# Patient Record
Sex: FEMALE | Race: OTHER | URBAN - METROPOLITAN AREA
[De-identification: names, ages, dates, MRNs, and addresses within clinical notes are randomized per-mention and may not be internally consistent; named-entity substitution may affect disease eponyms.]

---

## 2022-03-19 PROBLEM — R87.610 ASCUS WITH POSITIVE HIGH RISK HPV CERVICAL: Status: ACTIVE | Noted: 2019-10-14

## 2022-03-19 PROBLEM — R87.810 ASCUS WITH POSITIVE HIGH RISK HPV CERVICAL: Status: ACTIVE | Noted: 2019-10-14

## 2022-03-20 PROBLEM — R10.84 GENERALIZED ABDOMINAL PAIN: Status: ACTIVE | Noted: 2020-02-10

## 2023-05-24 ENCOUNTER — TELEPHONE (OUTPATIENT)
Dept: OBGYN CLINIC | Age: 28
End: 2023-05-24

## 2023-11-14 ENCOUNTER — OFFICE VISIT (OUTPATIENT)
Dept: OBGYN CLINIC | Age: 28
End: 2023-11-14
Payer: COMMERCIAL

## 2023-11-14 VITALS
WEIGHT: 168 LBS | HEIGHT: 55 IN | DIASTOLIC BLOOD PRESSURE: 80 MMHG | BODY MASS INDEX: 38.88 KG/M2 | SYSTOLIC BLOOD PRESSURE: 118 MMHG

## 2023-11-14 DIAGNOSIS — Z12.4 SCREENING FOR CERVICAL CANCER: ICD-10-CM

## 2023-11-14 DIAGNOSIS — Z12.39 SCREENING BREAST EXAMINATION: ICD-10-CM

## 2023-11-14 DIAGNOSIS — N94.6 DYSMENORRHEA: ICD-10-CM

## 2023-11-14 DIAGNOSIS — Z11.51 SCREENING FOR HPV (HUMAN PAPILLOMAVIRUS): ICD-10-CM

## 2023-11-14 DIAGNOSIS — Z86.39 HISTORY OF THYROID NODULE: ICD-10-CM

## 2023-11-14 DIAGNOSIS — Z01.419 WELL WOMAN EXAM WITH ROUTINE GYNECOLOGICAL EXAM: Primary | ICD-10-CM

## 2023-11-14 DIAGNOSIS — I95.1 SYMPATHOTONIC ORTHOSTATIC HYPOTENSION: ICD-10-CM

## 2023-11-14 DIAGNOSIS — N92.0 MENORRHAGIA WITH REGULAR CYCLE: ICD-10-CM

## 2023-11-14 DIAGNOSIS — E78.89 LIPIDS ABNORMAL: ICD-10-CM

## 2023-11-14 DIAGNOSIS — D50.0 IRON DEFICIENCY ANEMIA DUE TO CHRONIC BLOOD LOSS: ICD-10-CM

## 2023-11-14 PROBLEM — E66.01 SEVERE OBESITY WITH BODY MASS INDEX (BMI) OF 35.0 TO 39.9 WITH SERIOUS COMORBIDITY (HCC): Status: ACTIVE | Noted: 2018-09-04

## 2023-11-14 PROBLEM — R87.610 ASCUS WITH POSITIVE HIGH RISK HPV CERVICAL: Status: ACTIVE | Noted: 2019-10-14

## 2023-11-14 PROBLEM — Z97.5 IUD (INTRAUTERINE DEVICE) IN PLACE: Status: ACTIVE | Noted: 2019-09-25

## 2023-11-14 PROBLEM — N20.1 URETEROLITHIASIS: Status: ACTIVE | Noted: 2022-11-07

## 2023-11-14 PROBLEM — Q77.4 ACHONDROPLASIA: Status: ACTIVE | Noted: 2018-08-02

## 2023-11-14 PROBLEM — R87.810 ASCUS WITH POSITIVE HIGH RISK HPV CERVICAL: Status: ACTIVE | Noted: 2019-10-14

## 2023-11-14 LAB
ERYTHROCYTE [DISTWIDTH] IN BLOOD BY AUTOMATED COUNT: 16.6 % (ref 11.9–14.6)
HCT VFR BLD AUTO: 36.9 % (ref 35.8–46.3)
HGB BLD-MCNC: 10.6 G/DL (ref 11.7–15.4)
MCH RBC QN AUTO: 19.6 PG (ref 26.1–32.9)
MCHC RBC AUTO-ENTMCNC: 28.7 G/DL (ref 31.4–35)
MCV RBC AUTO: 68.3 FL (ref 82–102)
NRBC # BLD: 0 K/UL (ref 0–0.2)
PLATELET # BLD AUTO: 316 K/UL (ref 150–450)
PMV BLD AUTO: 10.9 FL (ref 9.4–12.3)
RBC # BLD AUTO: 5.4 M/UL (ref 4.05–5.2)
TSH W FREE THYROID IF ABNORMAL: 1.45 UIU/ML (ref 0.36–3.74)
WBC # BLD AUTO: 8.8 K/UL (ref 4.3–11.1)

## 2023-11-14 PROCEDURE — 99395 PREV VISIT EST AGE 18-39: CPT | Performed by: OBSTETRICS & GYNECOLOGY

## 2023-11-14 RX ORDER — DROSPIRENONE AND ETHINYL ESTRADIOL 0.02-3(28)
KIT ORAL
Qty: 3 PACKET | Refills: 5 | Status: SHIPPED | OUTPATIENT
Start: 2023-11-14

## 2023-11-14 ASSESSMENT — PATIENT HEALTH QUESTIONNAIRE - PHQ9
SUM OF ALL RESPONSES TO PHQ QUESTIONS 1-9: 0
SUM OF ALL RESPONSES TO PHQ QUESTIONS 1-9: 0
SUM OF ALL RESPONSES TO PHQ9 QUESTIONS 1 & 2: 0
2. FEELING DOWN, DEPRESSED OR HOPELESS: 0
1. LITTLE INTEREST OR PLEASURE IN DOING THINGS: 0
SUM OF ALL RESPONSES TO PHQ QUESTIONS 1-9: 0
SUM OF ALL RESPONSES TO PHQ QUESTIONS 1-9: 0

## 2023-11-14 NOTE — PROGRESS NOTES
difficult to isolate thyroid, no masses appreciated. Check US/clarify    7. Her daughter is 6years old    Re ROS--> non gynecologic concerns referred back to her PCP or appropriate specialist.     Wt Readings from Last 3 Encounters: Wt Readings from Last 3 Encounters:   11/14/23 76.2 kg (168 lb)   08/13/21 74.4 kg (164 lb)       BP Readings from Last 3 Encounters:  BP Readings from Last 3 Encounters:   11/14/23 118/80   08/13/21 112/68         Diagnosis Orders   1. Well woman exam with routine gynecological exam  PAP IG, HPV Rfx HPV 16/18,45    PAP IG, HPV Rfx HPV 16/18,45      2. Menorrhagia with regular cycle  CBC    TSH with Reflex    drospirenone-ethinyl estradiol (KAVITA) 3-0.02 MG per tablet    CBC    TSH with Reflex    US HEAD NECK SOFT TISSUE THYROID      3. Dysmenorrhea  drospirenone-ethinyl estradiol (KAVITA) 3-0.02 MG per tablet    US HEAD NECK SOFT TISSUE THYROID      4. Sympathotonic orthostatic hypotension        5. History of thyroid nodule  US HEAD NECK SOFT TISSUE THYROID      6. Screening breast examination        7. Screening for cervical cancer  PAP IG, HPV Rfx HPV 16/18,45    PAP IG, HPV Rfx HPV 16/18,45      8. Screening for HPV (human papillomavirus)  PAP IG, HPV Rfx HPV 16/18,45    PAP IG, HPV Rfx HPV 16/18,45      9. Iron deficiency anemia due to chronic blood loss  Ferritin      10. Lipids abnormal          Orders Placed This Encounter   Procedures    US HEAD NECK SOFT TISSUE THYROID    PAP IG, HPV Rfx HPV 16/18,45    CBC    TSH with Reflex    Ferritin     Dictated using voice recognition software.   Proofread but unrecognized errors may exist.    Signed by:  Whitfield Litten, MD, [facility]

## 2023-11-18 LAB
COLLECTION METHOD: NORMAL
CYTOLOGIST CVX/VAG CYTO: NORMAL
CYTOLOGY CVX/VAG DOC THIN PREP: NORMAL
DATE OF LMP: NORMAL
HPV APTIMA: NEGATIVE
Lab: NORMAL
OTHER PT INFO: NORMAL
PAP SOURCE: NORMAL
PATH REPORT.FINAL DX SPEC: NORMAL
PREV CYTO INFO: NORMAL
PREV TREATMENT RESULTS: NORMAL
PREV TREATMENT: NORMAL
STAT OF ADQ CVX/VAG CYTO-IMP: NORMAL

## 2023-12-11 ENCOUNTER — NURSE ONLY (OUTPATIENT)
Dept: OBGYN CLINIC | Age: 28
End: 2023-12-11

## 2023-12-11 DIAGNOSIS — E61.1 IRON DEFICIENCY: Primary | ICD-10-CM

## 2023-12-11 LAB
CHOLEST SERPL-MCNC: 202 MG/DL
HDLC SERPL-MCNC: 41 MG/DL (ref 40–60)
HDLC SERPL: 4.9
LDLC SERPL CALC-MCNC: 111.4 MG/DL
TRIGL SERPL-MCNC: 248 MG/DL (ref 35–150)
VLDLC SERPL CALC-MCNC: 49.6 MG/DL (ref 6–23)

## 2025-06-12 ENCOUNTER — RX ONLY (RX ONLY)
Age: 30
End: 2025-06-12

## 2025-06-12 ENCOUNTER — APPOINTMENT (OUTPATIENT)
Dept: URBAN - METROPOLITAN AREA CLINIC 330 | Facility: CLINIC | Age: 30
Setting detail: DERMATOLOGY
End: 2025-06-12

## 2025-06-12 DIAGNOSIS — B07.0 PLANTAR WART: ICD-10-CM | Status: INADEQUATELY CONTROLLED

## 2025-06-12 DIAGNOSIS — L91.8 OTHER HYPERTROPHIC DISORDERS OF THE SKIN: ICD-10-CM | Status: INADEQUATELY CONTROLLED

## 2025-06-12 DIAGNOSIS — D22 MELANOCYTIC NEVI: ICD-10-CM | Status: STABLE

## 2025-06-12 PROBLEM — D22.39 MELANOCYTIC NEVI OF OTHER PARTS OF FACE: Status: ACTIVE | Noted: 2025-06-12

## 2025-06-12 PROCEDURE — ? TREATMENT REGIMEN

## 2025-06-12 PROCEDURE — ? COUNSELING

## 2025-06-12 PROCEDURE — ? PRESCRIPTION

## 2025-06-12 PROCEDURE — ? BENIGN DESTRUCTION COSMETIC

## 2025-06-12 PROCEDURE — ? PRESCRIPTION MEDICATION MANAGEMENT

## 2025-06-12 PROCEDURE — ? LIQUID NITROGEN

## 2025-06-12 RX ORDER — PHARMACY COMPOUNDING ACCESSORY
EACH MISCELLANEOUS
Qty: 1 | Refills: 3 | Status: ERX

## 2025-06-12 RX ORDER — PHARMACY COMPOUNDING ACCESSORY
EACH MISCELLANEOUS
Qty: 1 | Refills: 3 | Status: CANCELLED | COMMUNITY
Start: 2025-06-12

## 2025-06-12 RX ADMIN — Medication: at 00:00

## 2025-06-12 ASSESSMENT — LOCATION SIMPLE DESCRIPTION DERM
LOCATION SIMPLE: LEFT UPPER BACK
LOCATION SIMPLE: LEFT CHEEK
LOCATION SIMPLE: LEFT PLANTAR SURFACE

## 2025-06-12 ASSESSMENT — LOCATION ZONE DERM
LOCATION ZONE: TRUNK
LOCATION ZONE: FACE
LOCATION ZONE: FEET

## 2025-06-12 ASSESSMENT — LOCATION DETAILED DESCRIPTION DERM
LOCATION DETAILED: LEFT INFERIOR CENTRAL MALAR CHEEK
LOCATION DETAILED: LEFT SUPERIOR UPPER BACK
LOCATION DETAILED: LEFT MEDIAL PLANTAR MIDFOOT

## 2025-06-12 NOTE — HPI: EVALUATION OF SKIN LESION(S)
What Type Of Note Output Would You Prefer (Optional)?: Bullet Format
Hpi Title: Evaluation of a Skin Lesion
Additional History: Patient is here for spots of concern on her back. She says the spot on her back appeared about a month ago. Pt denies any hx skin cancer.

## 2025-06-12 NOTE — PROCEDURE: PRESCRIPTION MEDICATION MANAGEMENT
Last Medication check 7-1-19  Last Medication refill 10mg- 10-9-19; 2.5mg- 9-4-19  Due for Medication Check by 1-1-20; appt scheduled    PDMP:  Dexmethylphenidate HCl  10MG / Capsule Extended Release 24 Hour  Rx# [id number] Stimulant Qty: 30  Days: 30  Refills: 0 Prescribed: 10/9/2019  Dispensed: 10/9/2019    Dexmethylphenidate HCl  2.5MG / Tablet  Rx# [id number] Stimulant Qty: 30  Days: 30  Refills: 0 Prescribed: 9/4/2019  Dispensed: 9/4/2019  
Initiate Treatment: pharmacy compounding accessory wart compound apply at night and allow to dry then cover with duct tape
Render In Strict Bullet Format?: No
Plan: Advised pt that LN2 treatment, can require multiple freezings. \\nAdvised pt to use rx nightly and secure with a piece of duct tape. \\nPlan to reevaluate in 1 month.
Detail Level: Zone

## 2025-06-12 NOTE — PROCEDURE: TREATMENT REGIMEN
Detail Level: Zone
Plan: Advised pt skin tag should fall off with LN2 treatment. Can refreeze in 1 month PRN